# Patient Record
Sex: FEMALE | Race: BLACK OR AFRICAN AMERICAN | NOT HISPANIC OR LATINO | URBAN - METROPOLITAN AREA
[De-identification: names, ages, dates, MRNs, and addresses within clinical notes are randomized per-mention and may not be internally consistent; named-entity substitution may affect disease eponyms.]

---

## 2018-06-28 ENCOUNTER — OUTPATIENT (OUTPATIENT)
Dept: OUTPATIENT SERVICES | Facility: HOSPITAL | Age: 38
LOS: 1 days | Discharge: HOME | End: 2018-06-28

## 2018-06-28 DIAGNOSIS — Z00.01 ENCOUNTER FOR GENERAL ADULT MEDICAL EXAMINATION WITH ABNORMAL FINDINGS: ICD-10-CM

## 2021-01-05 ENCOUNTER — NON-APPOINTMENT (OUTPATIENT)
Age: 41
End: 2021-01-05

## 2021-01-05 DIAGNOSIS — F32.0 MAJOR DEPRESSIVE DISORDER, SINGLE EPISODE, MILD: ICD-10-CM

## 2021-01-05 DIAGNOSIS — Z78.9 OTHER SPECIFIED HEALTH STATUS: ICD-10-CM

## 2021-01-05 DIAGNOSIS — D57.3 SICKLE-CELL TRAIT: ICD-10-CM

## 2021-01-05 DIAGNOSIS — Z87.59 PERSONAL HISTORY OF OTHER COMPLICATIONS OF PREGNANCY, CHILDBIRTH AND THE PUERPERIUM: ICD-10-CM

## 2021-01-05 DIAGNOSIS — Z87.42 PERSONAL HISTORY OF OTHER DISEASES OF THE FEMALE GENITAL TRACT: ICD-10-CM

## 2021-01-05 PROBLEM — Z00.00 ENCOUNTER FOR PREVENTIVE HEALTH EXAMINATION: Status: ACTIVE | Noted: 2021-01-05

## 2021-01-05 LAB — CYTOLOGY CVX/VAG DOC THIN PREP: NORMAL

## 2021-02-02 ENCOUNTER — APPOINTMENT (OUTPATIENT)
Dept: OBGYN | Facility: CLINIC | Age: 41
End: 2021-02-02

## 2021-02-24 ENCOUNTER — APPOINTMENT (OUTPATIENT)
Dept: SURGERY | Facility: CLINIC | Age: 41
End: 2021-02-24
Payer: COMMERCIAL

## 2021-02-24 VITALS
HEIGHT: 66 IN | BODY MASS INDEX: 24.11 KG/M2 | DIASTOLIC BLOOD PRESSURE: 74 MMHG | WEIGHT: 150 LBS | TEMPERATURE: 97.1 F | SYSTOLIC BLOOD PRESSURE: 118 MMHG

## 2021-02-24 PROCEDURE — 99202 OFFICE O/P NEW SF 15 MIN: CPT

## 2021-02-24 PROCEDURE — 99072 ADDL SUPL MATRL&STAF TM PHE: CPT

## 2021-02-24 NOTE — PHYSICAL EXAM
[de-identified] : well [de-identified] : no adenopathy [de-identified] : Symmetric x2.\par No palpable masses throughout.\par Nipples within normal limits.\par Axilla without palpable adenopathy.the patient was able to demonstrate a 1-2 mm sized lump in the right outer breast at approximately 9:00. It was rounded smooth and felt to be very insignificant.

## 2021-02-24 NOTE — CONSULT LETTER
[Dear  ___] : Dear  [unfilled], [Consult Letter:] : I had the pleasure of evaluating your patient, [unfilled]. [Please see my note below.] : Please see my note below. [Consult Closing:] : Thank you very much for allowing me to participate in the care of this patient.  If you have any questions, please do not hesitate to contact me. [Sincerely,] : Sincerely, [FreeTextEntry3] : Alberto Starkey M.D.

## 2021-02-24 NOTE — ASSESSMENT
[FreeTextEntry1] : The patient was reassured about this palpable finding and asked to continue to observe. If it grows larger in size than she densely should return for reevaluation. Otherwise annual screening mammogram and sonogram is appropriate.

## 2021-02-24 NOTE — HISTORY OF PRESENT ILLNESS
[de-identified] : This is a 40-year-old female who approximately one month ago noticed a tiny lump in her right outer breast. She was due for mammogram and sonogram which was performed on January 4 and demonstrated some cysts in the left breast but no findings in the right breast.\par \par She describes having bilateral accessory axillary breast tissue removed in the past but no other breast history. There is no family history and she was pregnant 3 times starting at the age of 28. Menses began at 11 and a T-tube present and she had short birth control pill use when trying fertility treatments.

## 2022-03-07 ENCOUNTER — APPOINTMENT (OUTPATIENT)
Dept: OBGYN | Facility: CLINIC | Age: 42
End: 2022-03-07

## 2022-04-10 ENCOUNTER — LABORATORY RESULT (OUTPATIENT)
Age: 42
End: 2022-04-10

## 2022-04-11 ENCOUNTER — APPOINTMENT (OUTPATIENT)
Dept: OBGYN | Facility: CLINIC | Age: 42
End: 2022-04-11

## 2022-04-11 ENCOUNTER — RESULT CHARGE (OUTPATIENT)
Age: 42
End: 2022-04-11

## 2022-04-11 ENCOUNTER — APPOINTMENT (OUTPATIENT)
Dept: OBGYN | Facility: CLINIC | Age: 42
End: 2022-04-11
Payer: COMMERCIAL

## 2022-04-11 VITALS
OXYGEN SATURATION: 98 % | TEMPERATURE: 98.3 F | HEART RATE: 80 BPM | HEIGHT: 66 IN | DIASTOLIC BLOOD PRESSURE: 62 MMHG | BODY MASS INDEX: 24.11 KG/M2 | SYSTOLIC BLOOD PRESSURE: 110 MMHG | WEIGHT: 150 LBS

## 2022-04-11 DIAGNOSIS — Z01.419 ENCOUNTER FOR GYNECOLOGICAL EXAMINATION (GENERAL) (ROUTINE) W/OUT ABNORMAL FINDINGS: ICD-10-CM

## 2022-04-11 LAB
BILIRUB UR QL STRIP: NORMAL
GLUCOSE UR-MCNC: NORMAL
HGB UR QL STRIP.AUTO: NORMAL
KETONES UR-MCNC: NORMAL
LEUKOCYTE ESTERASE UR QL STRIP: NORMAL
NITRITE UR QL STRIP: NORMAL
PROT UR STRIP-MCNC: NORMAL

## 2022-04-11 PROCEDURE — 81003 URINALYSIS AUTO W/O SCOPE: CPT | Mod: QW

## 2022-04-11 PROCEDURE — 99386 PREV VISIT NEW AGE 40-64: CPT

## 2022-09-21 ENCOUNTER — APPOINTMENT (OUTPATIENT)
Dept: OBGYN | Facility: CLINIC | Age: 42
End: 2022-09-21

## 2022-09-21 DIAGNOSIS — N92.6 IRREGULAR MENSTRUATION, UNSPECIFIED: ICD-10-CM

## 2022-09-21 LAB
BILIRUB UR QL STRIP: NEGATIVE
CLARITY UR: CLEAR
COLLECTION METHOD: NORMAL
GLUCOSE UR-MCNC: NEGATIVE
HCG UR QL: 0.2 EU/DL
HGB UR QL STRIP.AUTO: NEGATIVE
KETONES UR-MCNC: NEGATIVE
LEUKOCYTE ESTERASE UR QL STRIP: NEGATIVE
NITRITE UR QL STRIP: NEGATIVE
PH UR STRIP: 7
PROT UR STRIP-MCNC: NORMAL
SP GR UR STRIP: 1.03

## 2022-09-21 PROCEDURE — 99213 OFFICE O/P EST LOW 20 MIN: CPT

## 2022-09-21 PROCEDURE — 81003 URINALYSIS AUTO W/O SCOPE: CPT | Mod: QW

## 2022-09-22 VITALS
OXYGEN SATURATION: 98 % | WEIGHT: 153 LBS | BODY MASS INDEX: 23.19 KG/M2 | SYSTOLIC BLOOD PRESSURE: 110 MMHG | TEMPERATURE: 98.7 F | HEIGHT: 68 IN | HEART RATE: 80 BPM | DIASTOLIC BLOOD PRESSURE: 70 MMHG

## 2022-09-22 LAB
ESTRADIOL SERPL-MCNC: 327 PG/ML
FSH SERPL-MCNC: 4.3 IU/L
HCG SERPL-MCNC: <0.6 MIU/ML
PROLACTIN SERPL-MCNC: 13.6 NG/ML
TSH SERPL-ACNC: 0.94 UIU/ML

## 2022-09-22 NOTE — PLAN
[FreeTextEntry1] : Pt here for new onset of irregular period x2\par exam normal\par sent for u/s and labs\par also rx for f/u mammogram and U/S \par rv in 6 months

## 2022-09-22 NOTE — HISTORY OF PRESENT ILLNESS
[Regular Cycle Intervals] : periods have been regular [Frequency: Q ___ days] : menstrual periods occur approximately every [unfilled] days [No] : Patient does not have concerns regarding sex [FreeTextEntry1] : 9/9/21

## 2023-03-02 ENCOUNTER — NON-APPOINTMENT (OUTPATIENT)
Age: 43
End: 2023-03-02

## 2023-03-30 ENCOUNTER — APPOINTMENT (OUTPATIENT)
Dept: BREAST CENTER | Facility: CLINIC | Age: 43
End: 2023-03-30
Payer: COMMERCIAL

## 2023-03-30 VITALS
DIASTOLIC BLOOD PRESSURE: 74 MMHG | HEIGHT: 68 IN | WEIGHT: 153 LBS | BODY MASS INDEX: 23.19 KG/M2 | SYSTOLIC BLOOD PRESSURE: 117 MMHG

## 2023-03-30 DIAGNOSIS — R92.2 INCONCLUSIVE MAMMOGRAM: ICD-10-CM

## 2023-03-30 PROCEDURE — 99204 OFFICE O/P NEW MOD 45 MIN: CPT

## 2023-03-30 NOTE — PHYSICAL EXAM
[Normocephalic] : normocephalic [EOMI] : extra ocular movement intact [No Supraclavicular Adenopathy] : no supraclavicular adenopathy [No Cervical Adenopathy] : no cervical adenopathy [Examined in the supine and seated position] : examined in the supine and seated position [No dominant masses] : no dominant masses left breast [No Nipple Retraction] : no left nipple retraction [No Nipple Discharge] : no left nipple discharge [Breast Nipple Inversion] : nipples not inverted [Breast Nipple Retraction] : nipples not retracted [Breast Nipple Flattening] : nipples not flattened [Breast Nipple Fissures] : nipples not fissured [No Axillary Lymphadenopathy] : no left axillary lymphadenopathy [Soft] : abdomen soft [No Rashes] : no rashes [No Ulceration] : no ulceration [de-identified] : NL respirations [de-identified] : 9:00 approx 1cm mobile mass

## 2023-03-30 NOTE — ASSESSMENT
[FreeTextEntry1] : Patient is a 42F with BIRADS 3 imaging.  She underwent screening mmg and right mmg/us in 5/2022 that’s showed right benign appearing cysts along with a right 9N7 1.1cm benign appearing nodule (BIRADS 3) with recommendation for an ultrasound in 6 months.  She most recently had bilateral mmg/us in 2/2023 that showed a left simple cysts and the right 9N7 stable benign appearing 1.1cm nodule (BIRADS 3).  I had a lengthy discussion with this patient regarding diagnostic results, impressions, recommendations, risks and benefits.  I explained to the patient the BIRADS system of grading and that her findings fall into category 3. Category 3 carries a less than 5% risk of malignancy. She can choose to follow up imaging. She is also aware that she can also choose to biopsy the lesion if she wishes to. I briefly discussed the procedure will be performed by a breast imaging specialist, and will include numbing with local anesthesia, followed by a small biopsy marker placement afterwards, which is usually not bothersome, and is not recognized by radiofrequency devices.  She understands her options and wants to proceed with a biopsy as she notes she is worries about the mass.  We discussed breast cysts. They are not pre-malignant nor do they have malignant potential and are hormonally influenced. They may grow or shrink in size as well as resolve spontaneously and there is usually no intervention unless they are symptomatic. In several large studies, patients with breast cysts and a positive family history had a higher relative risk of breast cancer (from 1.5 to 3).  We also discussed breast density. Increasing breast density has been found to increase ones risk of breast cancer, but at this time, there is no clear indication for additional imaging in this setting, as both US and MRI have not been found to improve survival. One can consider bilateral screening US. However, out of 1000 women screened, the use of routine US will only identify an additional 3-5 cancers. The use of US was found to increase the likelihood of undergoing more imaging and more biopsies. She does have dense breasts. We have decided to proceed with screening bilateral breast US in the future with her screening mammograms.  All questions and concerns were answered in detail.  Patient is for right USGB of BIRADS 3 mass.  She is to follow up after biopsy to discuss results and further management.  Total time spent on encounter was greater than 45 minutes , which included face to face time with the patient, performing an exam, reviewing previous medical records, reviewing current imaging/ pathology, documenting in patient record and coordinating care/imaging. Greater than 50% of the encounter was spent on counseling and coordination of her breast issue.

## 2023-03-30 NOTE — DATA REVIEWED
[FreeTextEntry1] : May 16, 2022	\par MAMMO TOMOSYNTHESIS CALLBACK FROM SCREENING RIGHT, US BREAST\par Clinical Indication: Abnormal Findings on Mammogram.\par Compared to: 04/16/2022, 01/04/2021, 02/15/2019, and 05/29/2013\par MAMMOGRAM: \par Tomosynthesis and 2D imaging of the breast(s) were performed.  Current study was also evaluated with a computer aided detection (CAD) system.\par  \par Breast Density: Heterogeneously dense, which may obscure small masses.\par  \par There is a well-circumscribed oval asymmetry in the upper right breast which correlates with a simple cyst on ultrasound.\par  \par There is a well-circumscribed oval fat-containing benign-appearing asymmetry in the right breast at 9:00 axis. \par  \par No other significant masses, calcifications, or other findings are seen in the right breast.\par  \par US BREAST LIMITED RIGHT\par  \par Color flow, Gray scale and real-time ultrasound of the right breast was performed and targeted to the area(s) of interest.\par  \par There is a simple anechoic 12 mm cyst in the right breast at 1:00 axis at 4 cm from the nipple. It correlates with the asymmetry visualized mammographically.\par  \par There is an incidental 5 mm cyst in the right breast at 10:00 axis at 4 cm from the nipple. \par  \par There is a well-circumscribed oval parallel benign-appearing asymmetry in the right breast at 9:00 axis at 7 cm from nipple measuring 11 x 3 x 9 mm. It correlates with the mammogram. \par  \par No other findings were seen sonographically in the right breast.\par  \par OVERALL IMPRESSION: \par  \par Probably benign nodule in the right breast at 9:00 axis. It can be reevaluated sonographically in 6 months.\par  \par A 6 month follow-up of the right breast(s) is recommended. A letter will be sent to the patient to return for follow up.\par  \par The findings and recommendations were communicated to the patient.\par  \par BI-RADS 3: PROBABLY BENIGN\par  \par \par \par \par Feb 16, 2023	\par MAMMO TOMOSYNTHESIS DIAGNOSTIC BILATERAL, US BREAST COMPLETE BILATERAL\par  \par Clinical Breast Exam: Patient does not report clinical breast exam in the last year.\par  \par Clinical Indication: Patient missed her recommended 6 month F/U. No family history of breast cancer.\par  \par Compared to: 04/16/2022, 01/04/2021, 02/15/2019 \par  \par MAMMOGRAM: \par  \par Tomosynthesis 3D and 2D imaging of the breast(s) were performed.  Current study was also evaluated with a computer aided detection (CAD) system.\par  \par Breast Density: Heterogeneously dense, which may obscure small masses.\par  \par There is a fat-containing asymmetry in the outer right breast at 9:00 axis which likely correlates with asymmetry visualized on ultrasound. No other masses, calcifications, or other findings are seen.\par  \par US BREAST COMPLETE BILATERAL\par  \par Ultrasound evaluation was performed including examination of all four quadrants of the breast(s) and the retroareolar regions.\par  \par Color flow, Gray scale and real-time ultrasound of both breasts was performed. \par  \par There is a stable well-circumscribed parallel benign-appearing nodule in the right breast at 9:00 axis at 7 cm from nipple measuring 11 x 3 x 9 mm. There is a simple anechoic 16 mm cyst in the left breast at 2:00 axis at 5 cm from the nipple. No suspicious abnormalities were seen sonographically.\par \par OVERALL IMPRESSION: \par Stable right breast nodule at 9:00 axis. It can be reevaluated mammographically and sonographically in 12 months at the time of next annual bilateral mammogram.\par  \par A 12 month follow-up mammogram and ultrasound of the right breast(s) is recommended. \par  \par A letter will be sent to the patient to return for follow up.\par  \par The findings and recommendations were communicated to the patient.\par  \par BI-RADS 3: PROBABLY BENIGN\par

## 2023-03-30 NOTE — HISTORY OF PRESENT ILLNESS
[FreeTextEntry1] : Efren is 42 year old female here for evaluation of BIRADS3 abnormality on RIGHT breast US since May 2022\par \par FHx: father with leukemia at 80\par \par Her imaging is as follows:\par 4/16/22: B scg mmg --> BIRADS 0\par Dense\par R 10:00 focal asymmetry\par \par 5/16/22: R mmg and US --> BIRADS 3\par Dense\par There is a well-circumscribed oval asymmetry in the upper right breast which correlates with a simple cyst on ultrasound.\par There is a well-circumscribed oval fat-containing benign-appearing asymmetry in the right breast at 9:00 axis. \par There is a simple anechoic 12 mm cyst in the right breast at 1:00 axis at 4 cm from the nipple. It correlates with the asymmetry visualized mammographically.\par There is an incidental 5 mm cyst in the right breast at 10:00 axis at 4 cm from the nipple. \par There is a well-circumscribed oval parallel benign-appearing asymmetry in the right breast at 9:00 axis at 7 cm from nipple measuring 11 x 3 x 9 mm. It correlates with the mammogram. \par Probably benign nodule in the right breast at 9:00 axis. It can be reevaluated sonographically in 6 months.\par \par 2/16/23 b/L DX mammo and US --> BIRADS 3\par -Breast Density: Heterogeneously dense\par - fat-containing asymmetry in the outer right breast at 9:00 axis which likely correlates with asymmetry visualized on ultrasound\par RIGHT US:\par - stable well-circumscribed parallel benign-appearing nodule in the right breast at 9N 7  measuring 11 x 3 x 9 mm. \par LEFT US:\par -simple anechoic 16 mm cyst @ 2N5 \par \par Patient denies any current complaints. She has been feeling the mass in her right breast since 2019 but states she worries about it. Wishes to get it biopsied. No other associated symptoms.

## 2023-03-30 NOTE — PAST MEDICAL HISTORY
[Menstruating] : The patient is menstruating [Menarche Age ____] : age at menarche was [unfilled] [Total Preg ___] : G[unfilled] [Live Births ___] : P[unfilled]  [FreeTextEntry6] : Yes, IVF [FreeTextEntry7] : 1 week [FreeTextEntry8] : 5 months

## 2024-01-10 ENCOUNTER — APPOINTMENT (OUTPATIENT)
Dept: OBGYN | Facility: CLINIC | Age: 44
End: 2024-01-10
Payer: COMMERCIAL

## 2024-01-10 VITALS
TEMPERATURE: 97.9 F | HEART RATE: 90 BPM | WEIGHT: 152 LBS | BODY MASS INDEX: 23.04 KG/M2 | HEIGHT: 68 IN | SYSTOLIC BLOOD PRESSURE: 104 MMHG | OXYGEN SATURATION: 98 % | DIASTOLIC BLOOD PRESSURE: 62 MMHG

## 2024-01-10 DIAGNOSIS — N76.0 ACUTE VAGINITIS: ICD-10-CM

## 2024-01-10 DIAGNOSIS — Z11.3 ENCOUNTER FOR SCREENING FOR INFECTIONS WITH A PREDOMINANTLY SEXUAL MODE OF TRANSMISSION: ICD-10-CM

## 2024-01-10 DIAGNOSIS — Z01.419 ENCOUNTER FOR GYNECOLOGICAL EXAMINATION (GENERAL) (ROUTINE) W/OUT ABNORMAL FINDINGS: ICD-10-CM

## 2024-01-10 DIAGNOSIS — Z11.51 ENCOUNTER FOR SCREENING FOR HUMAN PAPILLOMAVIRUS (HPV): ICD-10-CM

## 2024-01-10 DIAGNOSIS — N75.0 CYST OF BARTHOLIN'S GLAND: ICD-10-CM

## 2024-01-10 PROCEDURE — 99396 PREV VISIT EST AGE 40-64: CPT

## 2024-01-10 PROCEDURE — 99213 OFFICE O/P EST LOW 20 MIN: CPT | Mod: 25

## 2024-01-10 RX ORDER — SULFAMETHOXAZOLE AND TRIMETHOPRIM 800; 160 MG/1; MG/1
800-160 TABLET ORAL TWICE DAILY
Qty: 14 | Refills: 0 | Status: ACTIVE | COMMUNITY
Start: 2024-01-10 | End: 1900-01-01

## 2024-01-10 RX ORDER — METRONIDAZOLE 7.5 MG/G
0.75 GEL VAGINAL
Qty: 1 | Refills: 1 | Status: ACTIVE | COMMUNITY
Start: 2024-01-10 | End: 1900-01-01

## 2024-01-10 NOTE — HISTORY OF PRESENT ILLNESS
[Y] : Positive pregnancy history [Gonorrhea test offered] : Gonorrhea test offered [Chlamydia test offered] : Chlamydia test offered [Trichomonas test offered] : Trichomonas test offered [HPV test offered] : HPV test offered [TextBox_4] : PATIENT PRESENT FOR ANNUAL GYN  [Mammogramdate] : 2/16/23 [BreastSonogramDate] : -0- [PapSmeardate] : 4/11/22 [ColonoscopyDate] : -0- [BoneDensityDate] : -0- [LMPDate] : 12/29/23 [PGHxTotal] : 5 [[facility]] [facility] [[facility]] : 3 [PGHxABSpont] : 2 [Ultrasound] : ultrasound [TextBox_27] : TVS/ PELVIC US- 3/30/23 [Yes] : pregnancy [TextBox_6] : 12/29/23 [FreeTextEntry1] : 12/29/23

## 2024-01-10 NOTE — PLAN
[FreeTextEntry1] : WELL WOMAN EXAM PAP/ SURE SWAB METROGEL BACTRIM DS LEFT VULVA LESION BARTHOLIN'S CYST POSSIBLE I&D RTO 2 WEEKS/PRN

## 2024-01-10 NOTE — PHYSICAL EXAM
[Chaperone Present] : A chaperone was present in the examining room during all aspects of the physical examination [FreeTextEntry1] : AMARJIT MALLORY MA/ REMA [Alert] : alert [Appropriately responsive] : appropriately responsive [No Acute Distress] : no acute distress [No Lymphadenopathy] : no lymphadenopathy [Regular Rate Rhythm] : regular rate rhythm [No Murmurs] : no murmurs [Clear to Auscultation B/L] : clear to auscultation bilaterally [Soft] : soft [Non-tender] : non-tender [Non-distended] : non-distended [No HSM] : No HSM [No Mass] : no mass [No Lesions] : no lesions [Oriented x3] : oriented x3 [Examination Of The Breasts] : a normal appearance [No Masses] : no breast masses were palpable [Labia Majora] : normal [Labia Minora] : normal [Normal] : normal [Uterine Adnexae] : normal

## 2024-01-10 NOTE — PROCEDURE
[Cervical Pap Smear] : cervical Pap smear [Liquid Base] : liquid base [GC & Chlamydia via Pap] : GC & Chlamydia via Pap [Tolerated Well] : the patient tolerated the procedure well [No Complications] : there were no complications [de-identified] : HPV/ SURE SWAB

## 2024-01-14 LAB
A VAGINAE DNA VAG QL NAA+PROBE: NORMAL
BVAB2 DNA VAG QL NAA+PROBE: NORMAL
C KRUSEI DNA VAG QL NAA+PROBE: NEGATIVE
C KRUSEI DNA VAG QL NAA+PROBE: NEGATIVE
C KRUSEI DNA VAG QL NAA+PROBE: NORMAL
C KRUSEI DNA VAG QL NAA+PROBE: NORMAL
C TRACH RRNA SPEC QL NAA+PROBE: NEGATIVE
CANDIDA DNA VAG QL NAA+PROBE: NORMAL
MEGA1 DNA VAG QL NAA+PROBE: NORMAL
N GONORRHOEA RRNA SPEC QL NAA+PROBE: NEGATIVE
T VAGINALIS RRNA SPEC QL NAA+PROBE: NEGATIVE

## 2024-01-15 LAB — HPV HIGH+LOW RISK DNA PNL CVX: NOT DETECTED

## 2024-01-16 LAB — CYTOLOGY CVX/VAG DOC THIN PREP: NORMAL

## 2024-04-30 ENCOUNTER — APPOINTMENT (OUTPATIENT)
Dept: BREAST CENTER | Facility: CLINIC | Age: 44
End: 2024-04-30
Payer: COMMERCIAL

## 2024-04-30 VITALS
SYSTOLIC BLOOD PRESSURE: 123 MMHG | WEIGHT: 151 LBS | BODY MASS INDEX: 22.88 KG/M2 | DIASTOLIC BLOOD PRESSURE: 76 MMHG | HEIGHT: 68 IN

## 2024-04-30 DIAGNOSIS — R92.8 OTHER ABNORMAL AND INCONCLUSIVE FINDINGS ON DIAGNOSTIC IMAGING OF BREAST: ICD-10-CM

## 2024-04-30 DIAGNOSIS — N60.02 SOLITARY CYST OF LEFT BREAST: ICD-10-CM

## 2024-04-30 DIAGNOSIS — N63.10 UNSPECIFIED LUMP IN THE RIGHT BREAST, UNSPECIFIED QUADRANT: ICD-10-CM

## 2024-04-30 PROCEDURE — 99214 OFFICE O/P EST MOD 30 MIN: CPT

## 2024-04-30 NOTE — ASSESSMENT
[FreeTextEntry1] : Patient is a 43F with BIRADS 4 imaging.  She underwent screening mmg and right mmg/us in 5/2022 that's showed right benign appearing cysts along with a right 9N7 1.1cm benign appearing nodule (BIRADS 3) with recommendation for an ultrasound in 6 months.  She most recently had bilateral mmg/us in 2/2023 that showed a left simple cysts and the right 9N7 stable benign appearing 1.1cm nodule (BIRADS 3).  She wished to have a biopsy but did not follow up for biopsy or 6 month imaging.  She underwent bilateral mmg/us in 3/2024 which showed left breast mass decreased in size and right 9N7 1.4cm mass recommended for biopsy (BIRADS 4).  We discussed her imaging in detail. I briefly discussed the procedure will be performed by a breast imaging specialist, and will include numbing with local anesthesia, followed by a small biopsy marker placement afterwards, which is usually not bothersome, and is not recognized by radiofrequency devices.  She understands and wishes to proceed with biopsy.  We again discussed breast cysts. They are not pre-malignant nor do they have malignant potential and are hormonally influenced. They may grow or shrink in size as well as resolve spontaneously and there is usually no intervention unless they are symptomatic. In several large studies, patients with breast cysts and a positive family history had a higher relative risk of breast cancer (from 1.5 to 3).  All questions and concerns were answered in detail.  Patient is for right USGB.  She is to follow up after biopsy to discuss results and further management.  Total time spent on encounter was greater than 30 minutes , which included face to face time with the patient, performing an exam, reviewing previous medical records, reviewing current imaging/ pathology, documenting in patient record and coordinating care/imaging. Greater than 50% of the encounter was spent on counseling and coordination of her breast issue.

## 2024-04-30 NOTE — DATA REVIEWED
[FreeTextEntry1] : B/L Dx Mammo & Sono - 03/27/2024: MAMMOGRAM:    Tomosynthesis 3D and 2D imaging of the breast(s) were performed.  Current study was also evaluated with a computer aided detection (CAD) system.   Breast Density: Extremely dense, which lowers the sensitivity of mammography.   There is a focal well-circumscribed asymmetry in the right breast at 9:00 axis which correlates with ultrasound findings. No other findings are seen.   US BREAST COMPLETE BILATERAL   Ultrasound evaluation was performed including examination of all four quadrants of the breast(s) and the retroareolar regions.   Color flow, Gray scale and real-time ultrasound of both breasts was performed.    There is a well-circumscribed oval parallel nodule in the right breast at 9:00 axis at 7 cm from nipple measuring 13 x 4 x 10 mm. It correlates with the mammogram.   There is a cystic lesion in left breast at 2:00 axis at 5 cm from nipple measuring up to 7 mm, decreased from prior examination. There is a simple 8 mm cyst in the left breast at 11:00 axis at 1 cm from the nipple.    No other findings were seen sonographically. OVERALL IMPRESSION:    Right breast nodule at 9:00 axis measuring up to 13 mm. Ultrasound-guided core biopsy is advised to confirm benignity.   The findings and recommendations were discussed with the patient.       Biopsy of the right breast(s) is recommended.   A letter will be sent to the patient to return for a biopsy.   BI-RADS 4: SUSPICIOUS

## 2024-04-30 NOTE — HISTORY OF PRESENT ILLNESS
[FreeTextEntry1] : Efren is 42 year old female here for evaluation of BIRADS3 abnormality on RIGHT breast US since May 2022  FHx: father with leukemia at 80  Her imaging is as follows: 4/16/22: B scg mmg --> BIRADS 0 Dense R 10:00 focal asymmetry  5/16/22: R mmg and US --> BIRADS 3 Dense There is a well-circumscribed oval asymmetry in the upper right breast which correlates with a simple cyst on ultrasound. There is a well-circumscribed oval fat-containing benign-appearing asymmetry in the right breast at 9:00 axis.  There is a simple anechoic 12 mm cyst in the right breast at 1:00 axis at 4 cm from the nipple. It correlates with the asymmetry visualized mammographically. There is an incidental 5 mm cyst in the right breast at 10:00 axis at 4 cm from the nipple.  There is a well-circumscribed oval parallel benign-appearing asymmetry in the right breast at 9:00 axis at 7 cm from nipple measuring 11 x 3 x 9 mm. It correlates with the mammogram.  Probably benign nodule in the right breast at 9:00 axis. It can be reevaluated sonographically in 6 months.  2/16/23 b/L DX mammo and US --> BIRADS 3 -Breast Density: Heterogeneously dense - fat-containing asymmetry in the outer right breast at 9:00 axis which likely correlates with asymmetry visualized on ultrasound RIGHT US: - stable well-circumscribed parallel benign-appearing nodule in the right breast at 9N 7  measuring 11 x 3 x 9 mm.  LEFT US: -simple anechoic 16 mm cyst @ 2N5   Patient denies any current complaints. She has been feeling the mass in her right breast since 2019 but states she worries about it. Wishes to get it biopsied.  She did not have the biopsy or 6 month follow up imaging.  3/27/24: B mmg and US --> BIRADS 4 -Breast Density: Extremely dense, which lowers the sensitivity of mammography. -There is a focal well-circumscribed asymmetry in the right breast at 9:00 axis which correlates with ultrasound findings.  US BREAST COMPLETE BILATERAL: -There is a well-circumscribed oval parallel nodule in the right breast at 9:00 axis at 7 cm from nipple measuring 13 x 4 x 10 mm. It correlates with the mammogram. -There is a cystic lesion in left breast at 2:00 axis at 5 cm from nipple measuring up to 7 mm, decreased from prior examination.  -There is a simple 8 mm cyst in the left breast at 11:00 axis at 1 cm from the nipple.  OVERALL IMPRESSION: Right breast nodule at 9:00 axis measuring up to 13 mm. Ultrasound-guided core biopsy is advised to confirm benignity.  Notes no new complaints.

## 2024-04-30 NOTE — PHYSICAL EXAM
[Normocephalic] : normocephalic [EOMI] : extra ocular movement intact [No Supraclavicular Adenopathy] : no supraclavicular adenopathy [No Cervical Adenopathy] : no cervical adenopathy [Examined in the supine and seated position] : examined in the supine and seated position [No Nipple Retraction] : no left nipple retraction [No Nipple Discharge] : no left nipple discharge [No Axillary Lymphadenopathy] : no left axillary lymphadenopathy [No Rashes] : no rashes [No Ulceration] : no ulceration [de-identified] : NL respirations [de-identified] : 9:00 approx 1.5cm mass - recommended for biopsy [de-identified] : 2:00 mass correlating with imaging - decreased from prior

## 2024-12-18 ENCOUNTER — APPOINTMENT (OUTPATIENT)
Dept: OBGYN | Facility: CLINIC | Age: 44
End: 2024-12-18
Payer: COMMERCIAL

## 2024-12-18 VITALS
SYSTOLIC BLOOD PRESSURE: 110 MMHG | OXYGEN SATURATION: 99 % | HEART RATE: 88 BPM | HEIGHT: 68 IN | BODY MASS INDEX: 22.88 KG/M2 | TEMPERATURE: 98 F | WEIGHT: 151 LBS | DIASTOLIC BLOOD PRESSURE: 64 MMHG

## 2024-12-18 DIAGNOSIS — Z01.419 ENCOUNTER FOR GYNECOLOGICAL EXAMINATION (GENERAL) (ROUTINE) W/OUT ABNORMAL FINDINGS: ICD-10-CM

## 2024-12-18 LAB
BILIRUB UR QL STRIP: NORMAL
GLUCOSE UR-MCNC: NORMAL
HCG UR QL: 0.2 EU/DL
HGB UR QL STRIP.AUTO: NORMAL
KETONES UR-MCNC: NORMAL
PH UR STRIP: 6.5
PROT UR STRIP-MCNC: NORMAL
SP GR UR STRIP: 1.01

## 2024-12-18 PROCEDURE — 99396 PREV VISIT EST AGE 40-64: CPT

## 2024-12-18 PROCEDURE — 99459 PELVIC EXAMINATION: CPT

## 2024-12-18 PROCEDURE — 81003 URINALYSIS AUTO W/O SCOPE: CPT | Mod: QW

## 2024-12-20 LAB
C TRACH RRNA SPEC QL NAA+PROBE: NOT DETECTED
HPV HIGH+LOW RISK DNA PNL CVX: NOT DETECTED
N GONORRHOEA RRNA SPEC QL NAA+PROBE: NOT DETECTED
SOURCE AMPLIFICATION: NORMAL

## 2024-12-21 LAB
SOURCE AMPLIFICATION: NORMAL
T VAGINALIS RRNA SPEC QL NAA+PROBE: NOT DETECTED

## 2024-12-29 LAB — CYTOLOGY CVX/VAG DOC THIN PREP: NORMAL
